# Patient Record
Sex: FEMALE | Race: WHITE
[De-identification: names, ages, dates, MRNs, and addresses within clinical notes are randomized per-mention and may not be internally consistent; named-entity substitution may affect disease eponyms.]

---

## 2021-01-19 ENCOUNTER — TRANSCRIPTION ENCOUNTER (OUTPATIENT)
Age: 66
End: 2021-01-19

## 2021-02-16 ENCOUNTER — TRANSCRIPTION ENCOUNTER (OUTPATIENT)
Age: 66
End: 2021-02-16

## 2024-03-11 ENCOUNTER — NON-APPOINTMENT (OUTPATIENT)
Age: 69
End: 2024-03-11

## 2024-03-12 PROBLEM — Z00.00 ENCOUNTER FOR PREVENTIVE HEALTH EXAMINATION: Status: ACTIVE | Noted: 2024-03-12

## 2024-04-10 ENCOUNTER — APPOINTMENT (OUTPATIENT)
Dept: DERMATOLOGY | Facility: CLINIC | Age: 69
End: 2024-04-10
Payer: MEDICARE

## 2024-04-10 PROCEDURE — 99204 OFFICE O/P NEW MOD 45 MIN: CPT

## 2024-04-11 NOTE — HISTORY OF PRESENT ILLNESS
[FreeTextEntry1] : Mohs surgery consultation for a BCC on the nasal dorsum [de-identified] : 04/10/2024   Referred by: Self Biopsying provider: [facility] GEOVANNI Brewer (or Tess?)  Ms. JONO COHEN is a 68 year old F who presents for Mohs surgery consultation for BCC on the nasal dorsum. She reports a 1 mo history of a scabbing, bleeding, nonhealing spot on the top of her nose that has been growing.  After a biopsy revealed BCC she was referred to the [facility] for Mohs surgery, but as she lives in [place] she prefers not to drive to the [facility]. She also considered seeing Dr. Hayward for Mohs surgery.  She has a history of chronic pain but reports a high pain tolerance and does not like to take medication for her pain.  She requests that she be informed of any actions before they take place (drawing, numbing, etc) so that she is aware of what is being done. She reports a past unpleasant experience at a dermatologist where that did not happen and a procedure was performed on her back which felt uncomfortable to her and she was not able to anticipate what would happen.  She has a friend who recently had Mohs surgery on the left medial cheek with a long line that is healing well.  She notes that her son had a cut on the right upper eyelid from a glass table in the past and he healed well.   SH: Works as a  at a pre-K through [employer] school. Lives in [place].  -  drives Upcoming travel plans: no travel but has an event coming up on April 26th  Pertinent positives noted below  Immunosuppression: No Blood thinners: No Antibiotic Prophylaxis: None  Medical implants: None  The patient's review of systems questionnaire was reviewed. Education needs were identified. There were no barriers to learning.

## 2024-04-11 NOTE — ASSESSMENT
[FreeTextEntry1] : Mohs surgery consultation for BCC of the nasal dorsum -- I explained the treatment options of radiation therapy and Mohs surgery.  I recommended Mohs surgery due to the tumor type, location, and ill-defined nature of cancer. I explained that following extirpation there will be a full thickness defect of the involved area. The reconstructive options will be based on the defect size and surrounding tissue laxity of the involved area. Primary closure is only possible for smaller defects. For larger defects, local tissue rearrangement or skin grafting may be necessary. Risks following layered primary closure or local tissue rearrangement include wound dehiscence, contour irregularity, bleeding, infection, and paresthesia (nerve damage including sensory deficit or motor weakness). Risks following skin grafting include wound dehiscence, skin graft nonadherence (partial or complete), contour irregularity, bleeding, infection, paresthesia, and donor site complications. I explained that the patient will need to abstain from physical activity for 1-2 weeks following the surgery, that they would heal with a scar, and also discussed the chances of infection, bleeding, potential sensory or motor nerve damage, and recurrence.  The patient indicated that s/he understood the risks and wished to schedule the procedure. -- In particular, for reconstruction we discussed linear repair vs skin flap vs skin graft vs plastic surgery repair. I discussed referral to Dr. Burnham or to Dr. Osman, but she declines and would prefer I perform the reconstruction.  -- I also discussed sending a prescription for a pre-operative anxiolytic to be brought and take the day of surgery after signing consent. She is amenable  Thank you for this Mohs surgery referral. We recommended that Ms. JONO COHEN follow up with Her referring dermatologist for routine skin exams following surgery.   Nicci Garcia MD, [facility]  of Dermatology Director of Dermatologic Surgery [facility]  >40m eval and counseling

## 2024-04-11 NOTE — PHYSICAL EXAM
[Alert] : alert [Oriented x 3] : ~L oriented x 3 [Well Nourished] : well nourished [Conjunctiva Non-injected] : conjunctiva non-injected [No Visual Lymphadenopathy] : no visual  lymphadenopathy [No Clubbing] : no clubbing [No Edema] : no edema [No Bromhidrosis] : no bromhidrosis [No Chromhidrosis] : no chromhidrosis [FreeTextEntry3] : 0.6 cm ill-defined pink macule on the nasal dorsum

## 2024-05-04 RX ORDER — LORAZEPAM 0.5 MG/1
0.5 TABLET ORAL
Qty: 2 | Refills: 0 | Status: ACTIVE | COMMUNITY
Start: 2024-05-04 | End: 1900-01-01

## 2024-05-07 ENCOUNTER — APPOINTMENT (OUTPATIENT)
Dept: DERMATOLOGY | Facility: CLINIC | Age: 69
End: 2024-05-07
Payer: MEDICARE

## 2024-05-07 ENCOUNTER — NON-APPOINTMENT (OUTPATIENT)
Age: 69
End: 2024-05-07

## 2024-05-07 PROCEDURE — 13152 CMPLX RPR E/N/E/L 2.6-7.5 CM: CPT

## 2024-05-07 PROCEDURE — 17311 MOHS 1 STAGE H/N/HF/G: CPT

## 2024-05-07 RX ORDER — CEPHALEXIN 500 MG/1
500 TABLET ORAL
Qty: 14 | Refills: 0 | Status: ACTIVE | COMMUNITY
Start: 2024-05-07 | End: 1900-01-01

## 2024-05-07 RX ORDER — MUPIROCIN 20 MG/G
2 OINTMENT TOPICAL
Qty: 1 | Refills: 0 | Status: ACTIVE | COMMUNITY
Start: 2024-05-07 | End: 1900-01-01

## 2024-05-14 ENCOUNTER — APPOINTMENT (OUTPATIENT)
Dept: DERMATOLOGY | Facility: CLINIC | Age: 69
End: 2024-05-14
Payer: MEDICARE

## 2024-05-14 PROCEDURE — 99024 POSTOP FOLLOW-UP VISIT: CPT

## 2024-07-01 ENCOUNTER — APPOINTMENT (OUTPATIENT)
Dept: DERMATOLOGY | Facility: CLINIC | Age: 69
End: 2024-07-01
Payer: MEDICARE

## 2024-07-01 DIAGNOSIS — C44.311 BASAL CELL CARCINOMA OF SKIN OF NOSE: ICD-10-CM

## 2024-07-01 PROCEDURE — 99213 OFFICE O/P EST LOW 20 MIN: CPT
